# Patient Record
Sex: FEMALE | NOT HISPANIC OR LATINO | Employment: FULL TIME | URBAN - METROPOLITAN AREA
[De-identification: names, ages, dates, MRNs, and addresses within clinical notes are randomized per-mention and may not be internally consistent; named-entity substitution may affect disease eponyms.]

---

## 2023-07-05 LAB
CHLAMYDIA TRACH., AMPLIFIED: NEGATIVE
N. GONORRHEA, AMPLIFIED: NEGATIVE
TRICHOMONAS VAGINALIS: NEGATIVE

## 2023-12-14 ENCOUNTER — OFFICE VISIT (OUTPATIENT)
Dept: OBSTETRICS AND GYNECOLOGY | Facility: CLINIC | Age: 23
End: 2023-12-14
Payer: COMMERCIAL

## 2023-12-14 VITALS
WEIGHT: 160.2 LBS | HEIGHT: 65 IN | BODY MASS INDEX: 26.69 KG/M2 | SYSTOLIC BLOOD PRESSURE: 98 MMHG | DIASTOLIC BLOOD PRESSURE: 62 MMHG

## 2023-12-14 DIAGNOSIS — F41.9 ANXIETY: Primary | ICD-10-CM

## 2023-12-14 DIAGNOSIS — G43.109 MIGRAINE WITH AURA AND WITHOUT STATUS MIGRAINOSUS, NOT INTRACTABLE: ICD-10-CM

## 2023-12-14 DIAGNOSIS — Z97.5 BREAKTHROUGH BLEEDING ASSOCIATED WITH INTRAUTERINE DEVICE (IUD): ICD-10-CM

## 2023-12-14 DIAGNOSIS — N92.1 BREAKTHROUGH BLEEDING ASSOCIATED WITH INTRAUTERINE DEVICE (IUD): ICD-10-CM

## 2023-12-14 PROBLEM — M62.89 HIGH-TONE PELVIC FLOOR DYSFUNCTION: Status: ACTIVE | Noted: 2023-12-14

## 2023-12-14 PROBLEM — F32.A DEPRESSION: Status: ACTIVE | Noted: 2023-12-14

## 2023-12-14 PROBLEM — L68.0 HIRSUTISM: Status: ACTIVE | Noted: 2023-12-14

## 2023-12-14 PROBLEM — M41.125 ADOLESCENT IDIOPATHIC SCOLIOSIS OF THORACOLUMBAR REGION: Status: ACTIVE | Noted: 2023-12-14

## 2023-12-14 PROBLEM — N39.0 RECURRENT UTI: Status: ACTIVE | Noted: 2023-12-14

## 2023-12-14 PROBLEM — F41.0 PANIC ATTACKS: Status: ACTIVE | Noted: 2023-12-14

## 2023-12-14 PROCEDURE — 99213 OFFICE O/P EST LOW 20 MIN: CPT | Performed by: OBSTETRICS & GYNECOLOGY

## 2023-12-14 PROCEDURE — 1036F TOBACCO NON-USER: CPT | Performed by: OBSTETRICS & GYNECOLOGY

## 2023-12-14 RX ORDER — COPPER 313.4 MG/1
1 INTRAUTERINE DEVICE INTRAUTERINE ONCE
COMMUNITY
Start: 2021-02-18

## 2023-12-14 RX ORDER — SULFAMETHOXAZOLE AND TRIMETHOPRIM 800; 160 MG/1; MG/1
1 TABLET ORAL NIGHTLY PRN
COMMUNITY
Start: 2021-02-11

## 2023-12-14 RX ORDER — BUDESONIDE AND FORMOTEROL FUMARATE DIHYDRATE 160; 4.5 UG/1; UG/1
2 AEROSOL RESPIRATORY (INHALATION)
COMMUNITY
Start: 2023-08-29

## 2023-12-14 RX ORDER — AMITRIPTYLINE HYDROCHLORIDE 25 MG/1
1-2 TABLET, FILM COATED ORAL NIGHTLY
COMMUNITY
Start: 2022-08-21

## 2023-12-14 RX ORDER — MONTELUKAST SODIUM 10 MG/1
10 TABLET ORAL NIGHTLY
COMMUNITY
Start: 2019-02-18

## 2023-12-14 RX ORDER — ALBUTEROL SULFATE 90 UG/1
1 AEROSOL, METERED RESPIRATORY (INHALATION) EVERY 6 HOURS PRN
COMMUNITY
Start: 2016-05-09

## 2023-12-14 RX ORDER — CETIRIZINE HYDROCHLORIDE 10 MG/1
5 TABLET ORAL DAILY
COMMUNITY
Start: 2016-04-25

## 2023-12-14 ASSESSMENT — ENCOUNTER SYMPTOMS
MUSCULOSKELETAL NEGATIVE: 0
NEUROLOGICAL NEGATIVE: 0
CONSTITUTIONAL NEGATIVE: 0
GASTROINTESTINAL NEGATIVE: 0
RESPIRATORY NEGATIVE: 0
ALLERGIC/IMMUNOLOGIC NEGATIVE: 0
EYES NEGATIVE: 0
ENDOCRINE NEGATIVE: 0
HEMATOLOGIC/LYMPHATIC NEGATIVE: 0
CARDIOVASCULAR NEGATIVE: 0
PSYCHIATRIC NEGATIVE: 0

## 2023-12-14 ASSESSMENT — PAIN SCALES - GENERAL: PAINLEVEL: 0-NO PAIN

## 2023-12-14 NOTE — PROGRESS NOTES
"Mary Marquez is a 23 y.o. here for recurrent UTI, BTB with IUD on POP    HPI: She had been on the LoLoEstrin for 10 years.  It was not being covered and she is working with endo and rheum with fatigue and body aches through the [facility].  She now has [facility] insurance because her mom is working there and she has difficulties with medication coverage and finding good doctors that she trusts in that system.  She started Nita to see if the higher estrogen pill would help her with her symptoms, however stopped it due to the migraine with aura.     Her PMDD symptoms have been worse with the Slynd and having mood swings and is having more hair growth her upper lip shaving every 3 days.  Her depression and anxiety is worsening. 2-3 weeks of PMS symptoms then no period. Her mood and the paranoia is bothering her the most. Not knowing when she is going to have a period is very hard.        Social History     Substance and Sexual Activity   Sexual Activity Not on file       Past med hx and past surg hx reviewed and notable for: migraine with aura, PMDD, anxiety    Objective   BP 98/62   Ht 1.651 m (5' 5\")   Wt 72.7 kg (160 lb 3.2 oz)   LMP  (LMP Unknown) Comment: Pt not having periods due to BC  BMI 26.66 kg/m²      General:   Alert and oriented, in no acute distress   Neck:    Breast/Axilla:    Abdomen:    Vulva:    Vagina:    Cervix:    Uterus:    Adnexa:    Pelvic Floor    Psych Normal affect. Normal mood.      Assessment and Plan:    Problem List Items Addressed This Visit          Ob-Gyn Problems    Breakthrough bleeding associated with intrauterine device (IUD)       Other    Anxiety    Overview     We discussed that her mood symptoms are effecting her life so much.  She is not feeling well and has fatigue, stress and just talking with her therapist is not sufficient at this point.  She had tried Wellbutrin with Dr. Lazo in the past- however that did not work well.  We discussed that at this point, we are at " the limit of what we can do with hormonal regulation of PMDD and it seems like we need to do more.  We discussed that psychiatrists might better serve her in helping her find the right medication for her that has help relieve some of the mood symptoms that she has been suffering with.  We discussed that I do not know many psychiatrists at [facility] and suggested having her mom reach out and I also gave her [facility] as a resource.  We decided to keep using the Slynd continuously to suppress ovarian function and assist with the PMDD and that visits at  are not likely to be covered by her insurance and to follow up with me or Dr. Victoria whichever is covered by her insurance to prevent getting a big bill of going out of network.         Migraine with aura and without status migrainosus - Primary      No orders of the defined types were placed in this encounter.

## 2024-08-08 ENCOUNTER — TELEPHONE (OUTPATIENT)
Dept: OBSTETRICS AND GYNECOLOGY | Facility: CLINIC | Age: 24
End: 2024-08-08
Payer: COMMERCIAL

## 2024-08-08 NOTE — TELEPHONE ENCOUNTER
Contacted pt  Name and  verified  Pt states that she has not taken her OCP in the last 3-4 months  Has had 3 cycles in 7 weeks recently restarted OCP 2 days ago  After speaking with Jayro pt is to decide on taking OCP or not and monitor if they are helping with the irregular bleeding. Pt will continue to take OCP to see if it will help. Pt will contact PCP for any labwork and try to find an OBGYN in network to help manage care. Pt verbalized understanding.  No further questions or concerns at this time.

## 2024-09-25 ENCOUNTER — TELEPHONE (OUTPATIENT)
Dept: OBSTETRICS AND GYNECOLOGY | Facility: CLINIC | Age: 24
End: 2024-09-25
Payer: COMMERCIAL

## 2024-09-25 NOTE — TELEPHONE ENCOUNTER
Contacted pt  Name and  verified  Spoke with pt made aware of bleeding precautions of when to be seen at an ER  Pt states that she did not take OCP to see if it would help with bleeding  She has appt tomorrow morning to be evaluated with \[facility]\"" [facility]  She will contact [facility] to make them aware to see if she could be place on waiting list to be seen sooner  Pt aware that the instructions are to seek urgent care if needed,keep appt for tomorrow, try taking medication if desired, call [facility] for sooner appt  Pt verbalized understanding.  No further questions or concerns at this time.